# Patient Record
Sex: FEMALE | URBAN - METROPOLITAN AREA
[De-identification: names, ages, dates, MRNs, and addresses within clinical notes are randomized per-mention and may not be internally consistent; named-entity substitution may affect disease eponyms.]

---

## 2022-12-29 ENCOUNTER — WEB ENCOUNTER (OUTPATIENT)
Dept: URBAN - METROPOLITAN AREA CLINIC 113 | Facility: CLINIC | Age: 27
End: 2022-12-29

## 2022-12-29 ENCOUNTER — OFFICE VISIT (OUTPATIENT)
Dept: URBAN - METROPOLITAN AREA CLINIC 113 | Facility: CLINIC | Age: 27
End: 2022-12-29

## 2022-12-29 VITALS
DIASTOLIC BLOOD PRESSURE: 83 MMHG | HEART RATE: 83 BPM | SYSTOLIC BLOOD PRESSURE: 131 MMHG | WEIGHT: 216 LBS | RESPIRATION RATE: 16 BRPM | HEIGHT: 71 IN | TEMPERATURE: 98 F | BODY MASS INDEX: 30.24 KG/M2

## 2022-12-29 DIAGNOSIS — K60.0 ACUTE POSTERIOR ANAL FISSURE: ICD-10-CM

## 2022-12-29 DIAGNOSIS — R79.89 ELEVATED LFTS: ICD-10-CM

## 2022-12-29 PROCEDURE — 99204 OFFICE O/P NEW MOD 45 MIN: CPT | Performed by: INTERNAL MEDICINE

## 2022-12-29 RX ORDER — PNV NO.95/FERROUS FUM/FOLIC AC 28MG-0.8MG
1 TABLET TABLET ORAL ONCE A DAY
Status: ACTIVE | COMMUNITY

## 2022-12-29 NOTE — HPI-TODAY'S VISIT:
Ms. Avila is a 27-year-old woman with no chronic medical problems that presents for evaluation of perianal pain.  She had a baby on 2022 by .  Since that time she has experienced perianal pain with bowel movements.  The pain is burning in character.  She occasionally has some red blood with the bowel movement on the tissue paper but not in the toilet bowl.  Her bowel movements are fairly normal consistency.  She denies any abdominal pain.  No family history of inflammatory bowel disease, colon polyps or colorectal cancer.  She has not tried any over-the-counter medications for the perianal pain.  She has never undergone a colonoscopy.  She also reports a history of elevated liver enzymes.  These enzymes have waxed and waned over time.  Labs available for review are as follows: 2022: TB 0.5, alkaline phosphatase 37, AST 99, , albumin 4.6, total protein 7.7, hepatitis C antibody nonreactive. 2022: TB 0.3, alkaline phosphatase 73.6, ALT 21, AST 25, albumin 3.7, total protein 6.7. She denies any alcohol consumption, use of herbal medications, family history of liver disease.

## 2023-01-03 ENCOUNTER — TELEPHONE ENCOUNTER (OUTPATIENT)
Dept: URBAN - METROPOLITAN AREA CLINIC 113 | Facility: CLINIC | Age: 28
End: 2023-01-03

## 2023-01-07 ENCOUNTER — DASHBOARD ENCOUNTERS (OUTPATIENT)
Age: 28
End: 2023-01-07

## 2023-01-07 PROBLEM — 197151007: Status: ACTIVE | Noted: 2023-01-07

## 2023-01-07 PROBLEM — 863927004: Status: ACTIVE | Noted: 2023-01-07

## 2023-01-11 ENCOUNTER — WEB ENCOUNTER (OUTPATIENT)
Dept: URBAN - METROPOLITAN AREA CLINIC 113 | Facility: CLINIC | Age: 28
End: 2023-01-11

## 2023-01-17 ENCOUNTER — TELEPHONE ENCOUNTER (OUTPATIENT)
Dept: URBAN - METROPOLITAN AREA CLINIC 113 | Facility: CLINIC | Age: 28
End: 2023-01-17

## 2023-01-19 ENCOUNTER — WEB ENCOUNTER (OUTPATIENT)
Dept: URBAN - METROPOLITAN AREA CLINIC 113 | Facility: CLINIC | Age: 28
End: 2023-01-19
